# Patient Record
Sex: FEMALE | Race: WHITE | NOT HISPANIC OR LATINO
[De-identification: names, ages, dates, MRNs, and addresses within clinical notes are randomized per-mention and may not be internally consistent; named-entity substitution may affect disease eponyms.]

---

## 2023-01-12 ENCOUNTER — APPOINTMENT (OUTPATIENT)
Dept: BARIATRICS/WEIGHT MGMT | Facility: CLINIC | Age: 28
End: 2023-01-12
Payer: COMMERCIAL

## 2023-01-12 VITALS — WEIGHT: 255 LBS | BODY MASS INDEX: 46.93 KG/M2 | HEIGHT: 62 IN

## 2023-01-12 DIAGNOSIS — F31.60 BIPOLAR DISORDER, CURRENT EPISODE MIXED, UNSPECIFIED: ICD-10-CM

## 2023-01-12 DIAGNOSIS — M46.1 SACROILIITIS, NOT ELSEWHERE CLASSIFIED: ICD-10-CM

## 2023-01-12 PROCEDURE — 99214 OFFICE O/P EST MOD 30 MIN: CPT | Mod: 95

## 2023-01-12 RX ORDER — SERTRALINE HYDROCHLORIDE 50 MG/1
50 TABLET, FILM COATED ORAL DAILY
Refills: 0 | Status: ACTIVE | COMMUNITY
Start: 2023-01-12

## 2023-01-12 RX ORDER — BUSPIRONE HYDROCHLORIDE 10 MG/1
10 TABLET ORAL
Refills: 0 | Status: ACTIVE | COMMUNITY
Start: 2023-01-12

## 2023-01-12 RX ORDER — BUPROPION HYDROCHLORIDE 300 MG/1
300 TABLET, EXTENDED RELEASE ORAL DAILY
Refills: 0 | Status: ACTIVE | COMMUNITY
Start: 2023-01-12
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
## 2023-01-12 NOTE — ASSESSMENT
[FreeTextEntry1] : 27 y.o F with Class 3 obesity, with mild asthma, mild valve disease, history palpitations, depression, anxiety, bipolar, hypothyroid, diabetes now well controlled, possible PCOS, gerd, + LAUREN, h/o Diabetes - was on Victoza lost 50 lbs - 8-10 years ago, mixed hyperlipidemia\par \par - has been on phentermine before.  Wants to re-try. discussed side effects including palpitations, incr anxiety, insomnia. \par - take 1/2 tab only.  Also start topiramate 25mg x 1 week before dinner and incr to 50mg after 1 week\par - bring snacks with her to work - fruit, veg\par - go to grocery store to have above ready to bring\par - avoid [employer]enn[employer] donuts\par - she is exploring options to move to different place \par - incr water intake\par - continue PT, incr activity as tolerated\par - see RDN, psych, NP \par \par f/u 4-6 weeks

## 2023-01-12 NOTE — REASON FOR VISIT
[Home] : at home, [unfilled] , at the time of the visit. [Medical Office: ([facility])___] : at the medical office located in  [Initial Evaluation] : an initial evaluation

## 2023-02-13 ENCOUNTER — APPOINTMENT (OUTPATIENT)
Dept: BARIATRICS/WEIGHT MGMT | Facility: CLINIC | Age: 28
End: 2023-02-13
Payer: COMMERCIAL

## 2023-02-13 VITALS — WEIGHT: 260 LBS | BODY MASS INDEX: 47.55 KG/M2

## 2023-02-13 DIAGNOSIS — K21.9 GASTRO-ESOPHAGEAL REFLUX DISEASE W/OUT ESOPHAGITIS: ICD-10-CM

## 2023-02-13 DIAGNOSIS — F32.A ANXIETY DISORDER, UNSPECIFIED: ICD-10-CM

## 2023-02-13 DIAGNOSIS — F41.9 ANXIETY DISORDER, UNSPECIFIED: ICD-10-CM

## 2023-02-13 PROCEDURE — 99214 OFFICE O/P EST MOD 30 MIN: CPT | Mod: 95

## 2023-02-13 RX ORDER — TOPIRAMATE 25 MG/1
25 TABLET, FILM COATED ORAL
Qty: 60 | Refills: 1 | Status: DISCONTINUED | COMMUNITY
Start: 2023-01-12 | End: 2023-02-13

## 2023-02-13 RX ORDER — PHENTERMINE HYDROCHLORIDE 37.5 MG/1
37.5 TABLET ORAL
Qty: 30 | Refills: 0 | Status: DISCONTINUED | COMMUNITY
Start: 2023-01-12 | End: 2023-02-13

## 2023-02-13 NOTE — ASSESSMENT
[FreeTextEntry1] : 27 y.o F with Class 3 obesity, with mild asthma, mild valve disease, history palpitations, depression, anxiety, bipolar, hypothyroid, diabetes now well controlled, possible PCOS, gerd, + LAUREN, h/o Diabetes - was on Victoza lost 50 lbs - 8-10 years ago, mixed hyperlipidemia\par \par - switch from phent/top to -> Ozempic.  PCP discussed as well. \par - discussed healthier cereals, add fruit to lunch\par - bring snacks with her to work - fruit, veg\par - go to grocery store to have above ready to bring\par - avoid jennifer donuts, soda, fast food\par - she is exploring options to move to different place \par - incr water intake\par - continue PT, incr activity as tolerated\par - see RDN, psych, NP \par \par f/u 4-6 weeks

## 2023-02-13 NOTE — REASON FOR VISIT
[Follow-Up] : a follow-up visit [Home] : at home, [unfilled] , at the time of the visit. [Medical Office: ([facility])___] : at the medical office located in
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
## 2023-02-27 ENCOUNTER — TRANSCRIPTION ENCOUNTER (OUTPATIENT)
Age: 28
End: 2023-02-27

## 2023-03-13 ENCOUNTER — TRANSCRIPTION ENCOUNTER (OUTPATIENT)
Age: 28
End: 2023-03-13

## 2023-03-13 RX ORDER — SEMAGLUTIDE 1.34 MG/ML
2 INJECTION, SOLUTION SUBCUTANEOUS
Qty: 1.5 | Refills: 2 | Status: ACTIVE | COMMUNITY
Start: 2023-02-13 | End: 1900-01-01

## 2023-03-23 ENCOUNTER — APPOINTMENT (OUTPATIENT)
Dept: BARIATRICS/WEIGHT MGMT | Facility: CLINIC | Age: 28
End: 2023-03-23
Payer: COMMERCIAL

## 2023-03-23 VITALS — WEIGHT: 259 LBS | BODY MASS INDEX: 47.37 KG/M2

## 2023-03-23 DIAGNOSIS — E78.2 MIXED HYPERLIPIDEMIA: ICD-10-CM

## 2023-03-23 DIAGNOSIS — E78.1 PURE HYPERGLYCERIDEMIA: ICD-10-CM

## 2023-03-23 DIAGNOSIS — E66.9 OBESITY, UNSPECIFIED: ICD-10-CM

## 2023-03-23 DIAGNOSIS — E11.9 TYPE 2 DIABETES MELLITUS W/OUT COMPLICATIONS: ICD-10-CM

## 2023-03-23 PROCEDURE — 99214 OFFICE O/P EST MOD 30 MIN: CPT | Mod: 95

## 2023-03-23 NOTE — REASON FOR VISIT
[Follow-Up] : a follow-up visit [Home] : at home, [unfilled] , at the time of the visit. [Medical Office: ([facility])___] : at the medical office located in  [Patient] : the patient

## 2023-03-23 NOTE — ASSESSMENT
[FreeTextEntry1] : 27 y.o F with Class 3 obesity, with mild asthma, mild valve disease, history palpitations, depression, anxiety, bipolar, hypothyroid, diabetes now well controlled, possible PCOS, gerd, + LAUREN, h/o Diabetes - was on Victoza lost 50 lbs - 8-10 years ago, mixed hyperlipidemia\par \par - continue Ozempic 0.5mg - has a new pen\par - will check email for [facility] email\par - would be open to talking to RDN\par - discussed healthier cereals, add fruit to lunch - discussed GO to grocery store and get healthy snacks\par - bring snacks with her to work - fruit, veg\par - go to grocery store to have above ready to bring\par - avoid [employer] donuts, soda, fast food\par - she is exploring options to move to different place \par - incr water intake\par - continue PT, incr activity as tolerated, getting 2nd opinion regarding options\par - see RDN, psych, NP \par \par f/u 4-6 weeks
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
## 2023-04-28 ENCOUNTER — TRANSCRIPTION ENCOUNTER (OUTPATIENT)
Age: 28
End: 2023-04-28

## 2023-05-04 ENCOUNTER — APPOINTMENT (OUTPATIENT)
Dept: BARIATRICS/WEIGHT MGMT | Facility: CLINIC | Age: 28
End: 2023-05-04